# Patient Record
Sex: FEMALE | URBAN - METROPOLITAN AREA
[De-identification: names, ages, dates, MRNs, and addresses within clinical notes are randomized per-mention and may not be internally consistent; named-entity substitution may affect disease eponyms.]

---

## 2020-10-09 ENCOUNTER — HOSPITAL ENCOUNTER (EMERGENCY)
Facility: MEDICAL CENTER | Age: 20
End: 2020-10-09

## 2020-10-09 VITALS
WEIGHT: 141 LBS | RESPIRATION RATE: 18 BRPM | TEMPERATURE: 98.2 F | HEART RATE: 92 BPM | OXYGEN SATURATION: 98 % | DIASTOLIC BLOOD PRESSURE: 60 MMHG | SYSTOLIC BLOOD PRESSURE: 101 MMHG | BODY MASS INDEX: 26.62 KG/M2 | HEIGHT: 61 IN

## 2020-10-09 PROCEDURE — 302449 STATCHG TRIAGE ONLY (STATISTIC)

## 2020-10-09 NOTE — ED TRIAGE NOTES
"..  Chief Complaint   Patient presents with   • Flu Like Symptoms     congestion, fatigue, weakness, body aches, fever x's 1 day   • Shortness of Breath     w/ exertion x's 2 days   • Pregnancy     pt reports being pregnant and needing to set up prenatal care        ../60   Pulse 92   Temp 36.8 °C (98.2 °F) (Temporal)   Resp 18   Ht 1.549 m (5' 1\")   Wt 64 kg (141 lb)   SpO2 98%        Explained triage process, to waiting room. Asked to inform RN if questions or concerns arise.   "

## 2021-03-09 ENCOUNTER — HOSPITAL ENCOUNTER (OUTPATIENT)
Dept: HOSPITAL 87 - 8EST NSY | Age: 21
Setting detail: OBSERVATION
Discharge: HOME | End: 2021-03-09
Attending: OBSTETRICS & GYNECOLOGY | Admitting: OBSTETRICS & GYNECOLOGY
Payer: MEDICAID

## 2021-03-09 VITALS — WEIGHT: 178 LBS | BODY MASS INDEX: 33.61 KG/M2 | HEIGHT: 61 IN

## 2021-03-09 DIAGNOSIS — O99.891: Primary | ICD-10-CM

## 2021-03-09 DIAGNOSIS — M54.5: ICD-10-CM

## 2021-03-09 DIAGNOSIS — O26.893: ICD-10-CM

## 2021-03-09 DIAGNOSIS — Z3A.33: ICD-10-CM

## 2021-03-09 DIAGNOSIS — R10.30: ICD-10-CM

## 2021-03-09 PROCEDURE — 59025 FETAL NON-STRESS TEST: CPT

## 2021-03-09 PROCEDURE — 99281 EMR DPT VST MAYX REQ PHY/QHP: CPT
